# Patient Record
Sex: FEMALE | Race: BLACK OR AFRICAN AMERICAN | NOT HISPANIC OR LATINO | URBAN - NONMETROPOLITAN AREA
[De-identification: names, ages, dates, MRNs, and addresses within clinical notes are randomized per-mention and may not be internally consistent; named-entity substitution may affect disease eponyms.]

---

## 2016-09-29 PROBLEM — H40.013: Noted: 2018-10-25

## 2019-10-31 ENCOUNTER — IMPORTED ENCOUNTER (OUTPATIENT)
Dept: URBAN - NONMETROPOLITAN AREA CLINIC 1 | Facility: CLINIC | Age: 74
End: 2019-10-31

## 2019-10-31 PROCEDURE — 92014 COMPRE OPH EXAM EST PT 1/>: CPT

## 2019-10-31 NOTE — PATIENT DISCUSSION
*GLAUCOMA SUSPECT/OCULAR HYPERTENSION:.-  Discussed findings of exam in detail with the patient. -  discussed the risk of glaucoma and the importance of monitoring for this disease. OCT TODAY/STABLE*Diabetic Retinopathy:. -  Discussed findings of exam in detail with the patient. -  discussed the risk of diabetic damage of the retina with potential vision loss and the importance of routine follow-up. MONITOR*CATARACT OBSERVE-NO CHANGE IN GLASSES:.-	  We discussed the patients cataract(s) in detail with the patient.-	  The patient/ physician elected to wait on surgery at this time. -	  The patient was informed of the symptoms related to cataract progression.; Dr's Notes: IF no progression at next monitor glc shanita yearly

## 2020-11-05 ENCOUNTER — IMPORTED ENCOUNTER (OUTPATIENT)
Dept: URBAN - NONMETROPOLITAN AREA CLINIC 1 | Facility: CLINIC | Age: 75
End: 2020-11-05

## 2020-11-05 PROBLEM — E11.9: Noted: 2020-11-05

## 2020-11-05 PROBLEM — H25.13: Noted: 2020-11-05

## 2020-11-05 PROBLEM — H40.013: Noted: 2020-11-05

## 2020-11-05 PROCEDURE — 92133 CPTRZD OPH DX IMG PST SGM ON: CPT

## 2020-11-05 PROCEDURE — 92014 COMPRE OPH EXAM EST PT 1/>: CPT

## 2020-11-05 NOTE — PATIENT DISCUSSION
DM s -Stressed the importance of keeping blood sugars under control blood pressure under control and weight normalization and regular visits with PCP. -Explained the possible effects of poorly controlled diabetes and the damage that diabetes can cause to ocular health. -Patient to check HgbA1C.-Pt instructed to contact our office with any vision changes. Cataract OU-Not yet surgical. -Reviewed symptoms of advancing cataract growth such as glare and halos and decreased vision.-Continue to monitor for now. Pt will notify us if any new symptoms develop. Glaucoma Suspect-Based on cupping-Appears stable at this time.-Continue to monitor with exams and testing.-oct today stable ou; Dr's Notes: IF no progression at next monitor glc shanita yearly

## 2022-04-09 ASSESSMENT — VISUAL ACUITY
OS_CC: 20/50-1
OD_CC: 20/40-2
OD_SC: 20/70
OU_CC: 20/40
OD_CC: J1
OS_CC: J1
OS_SC: 20/40
OD_SC: 20/30
OS_SC: 20/70

## 2022-04-09 ASSESSMENT — TONOMETRY
OD_IOP_MMHG: 18
OS_IOP_MMHG: 18
OS_IOP_MMHG: 18
OD_IOP_MMHG: 18